# Patient Record
(demographics unavailable — no encounter records)

---

## 2025-02-03 NOTE — PHYSICAL EXAM
[FreeTextEntry1] : Alert, comfortable, well-developed in no apparent distress 5-month-old baby boy who allows to be examined.  He has a rash on his right cheek.  Right plagiocephaly.  No signs of metatarsus adductus, normal foot alignment. No obvious clinical orthopedic deformities in neither his lower or upper extremities.  Asymmetric thigh creases.  Henry, Ortolani and Galeazzi signs are negative. Hip abduction with flexion to 60-70 bilaterally. No hip clicks or clunks during the office visit. Normal range of motion of his knees ankles and feet for his age. Both feet are flexible. Upper extremities with full and symmetrical range of motion bilaterally. Symmetrical active movements of both lower and upper extremities. Spine clinically in the midline, no hairy patches or sacral dimples. No masses are felt in neither of the SCM muscles. Clavicles are present and intact. Full passive range of motion of the cervical spine. No plagiocephaly. Normal shaped face. No skin abnormalities. Abdomen soft, non-tender, no masses. No pain to percussion of renal fossae.

## 2025-02-03 NOTE — HISTORY OF PRESENT ILLNESS
[FreeTextEntry1] : Anish is a healthy almost 5-month-old baby boy brought in by his mother after being sent by his pediatrician for an orthopedic evaluation of his hips.  According to the mother, the pediatrician noticed a symmetric thigh creases.  This is the third child for this mother.  He has no risk factors for DDH.  No family history of DDH.

## 2025-02-03 NOTE — REVIEW OF SYSTEMS
[NI] : Endocrine [Nl] : Hematologic/Lymphatic [Change in Activity] : no change in activity [Fever Above 102] : no fever [Malaise] : no malaise [Rash] : rash

## 2025-02-03 NOTE — CONSULT LETTER
[Dear  ___] : Dear  [unfilled], [Consult Letter:] : I had the pleasure of evaluating your patient, [unfilled]. [Please see my note below.] : Please see my note below. [Consult Closing:] : Thank you very much for allowing me to participate in the care of this patient.  If you have any questions, please do not hesitate to contact me. [Sincerely,] : Sincerely, [FreeTextEntry3] : Bob Mathur MD Pediatric Orthopaedics [address] [address] Phone: [phone] Fax: [phone]

## 2025-02-03 NOTE — ASSESSMENT
[FreeTextEntry1] : Diagnosis: Asymmetric thigh creases, right plagiocephaly.  The history was obtained today from the child and parent; given the patient's age and/or the child's mental capacity, the history was unreliable and the parent was used as an independent historian..i  Healthy 5-month-old baby boy with the above diagnosis.  An ultrasound of the hips is ordered.  Mother states that she can book it herself a stronger procedure.  Once the ultrasound is done, I will call the mother with the results at [phone].  Mother is aware that he some cases we need to get the x-ray of the hips after age 6 7 months in the spite of the ultrasound.  He also has right plagiocephaly with a full passive range of motion of his neck.  Mother is being seen by neurology who are working on providing him with a possible helmet, recommendation that I agree with.  All of the mother's questions were addressed. She understood and agreed with the plan.  This note was generated using Dragon medical dictation software.  A reasonable effort has been made for proofreading its contents, but typos may still remain.  If there are any questions or points of clarification needed please do not hesitate to contact my office. Male

## 2025-02-03 NOTE — DEVELOPMENTAL MILESTONES
[Normal] : Developmental history within normal limits [Roll Over: ___ Months] : Roll Over: [unfilled] months [Too Young] : too young  [Not Yet Determined] : not yet determined [FreeTextEntry2] : No [FreeTextEntry3] : N/A